# Patient Record
Sex: MALE | Race: BLACK OR AFRICAN AMERICAN | NOT HISPANIC OR LATINO | URBAN - METROPOLITAN AREA
[De-identification: names, ages, dates, MRNs, and addresses within clinical notes are randomized per-mention and may not be internally consistent; named-entity substitution may affect disease eponyms.]

---

## 2020-01-16 ENCOUNTER — APPOINTMENT (OUTPATIENT)
Dept: URBAN - METROPOLITAN AREA CLINIC 219 | Age: 50
Setting detail: DERMATOLOGY
End: 2020-01-22

## 2020-01-16 DIAGNOSIS — L73.1 PSEUDOFOLLICULITIS BARBAE: ICD-10-CM

## 2020-01-16 DIAGNOSIS — L21.8 OTHER SEBORRHEIC DERMATITIS: ICD-10-CM

## 2020-01-16 DIAGNOSIS — B36.0 PITYRIASIS VERSICOLOR: ICD-10-CM

## 2020-01-16 PROBLEM — C18.9 MALIGNANT NEOPLASM OF COLON, UNSPECIFIED: Status: ACTIVE | Noted: 2020-01-16

## 2020-01-16 PROCEDURE — OTHER TREATMENT REGIMEN: OTHER

## 2020-01-16 PROCEDURE — OTHER MIPS QUALITY: OTHER

## 2020-01-16 PROCEDURE — 99243 OFF/OP CNSLTJ NEW/EST LOW 30: CPT

## 2020-01-16 PROCEDURE — OTHER PRESCRIPTION: OTHER

## 2020-01-16 PROCEDURE — OTHER COUNSELING: OTHER

## 2020-01-16 RX ORDER — CICLOPIROX OLAMINE 7.7 MG/100ML
SUSPENSION TOPICAL TWICE A DAY
Qty: 1 | Refills: 5 | Status: ERX | COMMUNITY
Start: 2020-01-16

## 2020-01-16 NOTE — PROCEDURE: TREATMENT REGIMEN
Plan: Selsun blue moisturizing shampoo with aloe (leave on for 2-5 minutes and rinse) alternating with nizoral shampoo \\nCiclopirox topical suspension apply twice a day as need for facial scaling
Plan: Diflucan 150 mg Take 1 pill once a week x4 weeks\\nCiclopirox topical solutions apply to worst areas twice a day as needed \\nSelsun blue moisturizing shampoo with aloe (leave on for 2-5 minutes and rinse) alternating with nizoral shampoo
Plan: Recommended 4% benzoyl peroxide facial wash once a day\\nRecommended pt to get a razor with a safe guard
Detail Level: Simple